# Patient Record
Sex: MALE | NOT HISPANIC OR LATINO | URBAN - METROPOLITAN AREA
[De-identification: names, ages, dates, MRNs, and addresses within clinical notes are randomized per-mention and may not be internally consistent; named-entity substitution may affect disease eponyms.]

---

## 2022-02-18 ENCOUNTER — HISTORICAL (OUTPATIENT)
Dept: CARDIOLOGY | Facility: HOSPITAL | Age: 47
End: 2022-02-18

## 2022-05-14 NOTE — OP NOTE
Patient:   Ayo Casper             MRN: [id number]            FIN: [id number]               Age:   46 years     Sex:  Male     :  1975   Associated Diagnoses:   CVA - Cerebrovascular accident; Palpitations   Author:   Dontrell Joyce DO      Brief Operative Note   Operative Information   Date/ Time:  2022 09:11:00.     Preoperative Diagnosis: CVA - Cerebrovascular accident (MJI66-HR I63.9), Palpitations (DIT80-EQ R00.2).     Postoperative Diagnosis: CVA - Cerebrovascular accident (EZG70-ZL I63.9), Palpitations (FAI24-GA R00.2).     Procedures Performed: Implantation of Implantable Loop Recorder - [employer]   .     Surgeon: Dontrell Joyce DO     Esimatefransico blood loss: No blood loss.     Description of Procedure/Findings/    Complications: See op report on chart.   .

## 2022-05-14 NOTE — DISCHARGE SUMMARY
DISCHARGE DATE:  02/18/2022    HOSPITAL COURSE:  Mr. Casper was admitted for evaluation and management of a recent cryptogenic stroke he suffered a few months ago, and for the elective implantation of implantable loop recorder to further evaluate these issues.  No new cardiovascular issues or problems reported.  As outlined in the operative report, under sterile surgical technique, a [employer] implantable loop recorder was implanted in the left parasternal location without complications.  Patient tolerated the procedure well, and postoperative course is being monitored closely in preparation of discharge home later today.    DISCHARGE DIAGNOSES:    1. Cryptogenic stroke.  2. Palpitations.   3. Hypertension.  4. Dyslipidemia.  5. Gastroesophageal reflux disease.  6. Chronic systolic congestive heart failure.    RECOMMENDATIONS:  Mr. Casper will be arranged for discharge home today, to continue on the same medications as on admission.  I have asked him to call or return should he have any further problems or complaints.  I will be checking on his progress in my office in the very near future.  We will continue to monitor his progress closely, and titrate medical therapies as tolerated.  He will be discharged on empiric Cipro for 5 days, and ibuprofen as needed for pain.  Patient's family is given instructions on wound care and also how to use the implantable loop recorder to further evaluate the issues and symptoms.  Further recommendations forthcoming.        ______________________________  Dontrell Joyce DO    CT/UD  DD:  02/18/2022  Time:  10:00AM  DT:  02/18/2022  Time:  10:16AM  Job #:  [id number]    cc: Dontrell Joyce DO

## 2023-05-16 DIAGNOSIS — H40.9 GLAUCOMA, UNSPECIFIED GLAUCOMA TYPE, UNSPECIFIED LATERALITY: Primary | ICD-10-CM

## 2023-12-15 ENCOUNTER — OFFICE VISIT (OUTPATIENT)
Dept: OPHTHALMOLOGY | Facility: CLINIC | Age: 48
End: 2023-12-15
Payer: MEDICAID

## 2023-12-15 DIAGNOSIS — H40.1134 PRIMARY OPEN ANGLE GLAUCOMA (POAG) OF BOTH EYES, INDETERMINATE STAGE: Primary | ICD-10-CM

## 2023-12-15 PROCEDURE — 92133 CPTRZD OPH DX IMG PST SGM ON: CPT | Mod: PBBFAC,PN | Performed by: STUDENT IN AN ORGANIZED HEALTH CARE EDUCATION/TRAINING PROGRAM

## 2023-12-15 PROCEDURE — 99212 OFFICE O/P EST SF 10 MIN: CPT | Mod: PBBFAC,PN | Performed by: STUDENT IN AN ORGANIZED HEALTH CARE EDUCATION/TRAINING PROGRAM

## 2023-12-15 PROCEDURE — 92133 CPTRZD OPH DX IMG PST SGM ON: CPT | Mod: PBBFAC,PN | Performed by: OPHTHALMOLOGY

## 2023-12-15 RX ORDER — AMOXICILLIN 500 MG
1 CAPSULE ORAL DAILY
COMMUNITY

## 2023-12-15 RX ORDER — CLOPIDOGREL BISULFATE 75 MG/1
75 TABLET ORAL
COMMUNITY

## 2023-12-15 RX ORDER — SACUBITRIL AND VALSARTAN 49; 51 MG/1; MG/1
1 TABLET, FILM COATED ORAL 2 TIMES DAILY
COMMUNITY

## 2023-12-15 RX ORDER — AMLODIPINE BESYLATE 5 MG/1
5 TABLET ORAL
COMMUNITY
Start: 2023-11-07

## 2023-12-15 RX ORDER — ATORVASTATIN CALCIUM 40 MG/1
40 TABLET, FILM COATED ORAL
COMMUNITY
Start: 2022-02-18

## 2023-12-15 RX ORDER — SPIRONOLACTONE 25 MG/1
25 TABLET ORAL
COMMUNITY

## 2023-12-15 RX ORDER — ASPIRIN 81 MG/1
81 TABLET ORAL
COMMUNITY
Start: 2022-02-18

## 2023-12-15 RX ORDER — CARVEDILOL 12.5 MG/1
12.5 TABLET ORAL EVERY 12 HOURS
COMMUNITY
Start: 2023-09-12

## 2023-12-15 NOTE — PROGRESS NOTES
OCT Mac 12/15/23   Normal foveal contour ou    OCT RNFL 12/15/23  All green ou, large cups      #POAG OU  -Referral from [facility] due to insurance change  -IOP 14 ou  -//567  -Gonio SS sup, CBB inf, TM lawson/temp ou  -Documented CDR 0.75 // 0.8 (declined DFE today)  -Unknown Tmax, on Latanaprost  -Hx stroke, may confound HVF results  With OCT showing minimal changes, will not change drops at this time.       RTC 4 weeks HVF 24-2, DFE

## 2023-12-26 DIAGNOSIS — H40.1134 PRIMARY OPEN ANGLE GLAUCOMA (POAG) OF BOTH EYES, INDETERMINATE STAGE: Primary | ICD-10-CM

## 2023-12-26 RX ORDER — LATANOPROST 50 UG/ML
1 SOLUTION/ DROPS OPHTHALMIC NIGHTLY
Qty: 2.5 ML | Refills: 11 | Status: SHIPPED | OUTPATIENT
Start: 2023-12-26 | End: 2024-12-27

## 2023-12-26 RX ORDER — LATANOPROST 50 UG/ML
1 SOLUTION/ DROPS OPHTHALMIC NIGHTLY
COMMUNITY
End: 2023-12-26 | Stop reason: SDUPTHER

## 2023-12-26 NOTE — TELEPHONE ENCOUNTER
----- Message from Chelsea Dumont sent at 12/22/2023 10:18 AM CST -----  Regarding: medication  Pt called said he had an appointment on 12/15 and medication was supposed to be called into his pharmacy, he checked with them this morning and they don't have any medication for him

## 2024-01-19 ENCOUNTER — OFFICE VISIT (OUTPATIENT)
Dept: OPHTHALMOLOGY | Facility: CLINIC | Age: 49
End: 2024-01-19
Payer: MEDICAID

## 2024-01-19 VITALS — WEIGHT: 187 LBS | BODY MASS INDEX: 29.35 KG/M2 | HEIGHT: 67 IN

## 2024-01-19 DIAGNOSIS — H40.1131 PRIMARY OPEN ANGLE GLAUCOMA (POAG) OF BOTH EYES, MILD STAGE: Primary | ICD-10-CM

## 2024-01-19 PROCEDURE — 99213 OFFICE O/P EST LOW 20 MIN: CPT | Mod: PBBFAC,PN | Performed by: STUDENT IN AN ORGANIZED HEALTH CARE EDUCATION/TRAINING PROGRAM

## 2024-01-19 PROCEDURE — 92083 EXTENDED VISUAL FIELD XM: CPT | Mod: PBBFAC,PN | Performed by: STUDENT IN AN ORGANIZED HEALTH CARE EDUCATION/TRAINING PROGRAM

## 2024-01-19 PROCEDURE — 92083 EXTENDED VISUAL FIELD XM: CPT | Mod: PBBFAC,PN | Performed by: OPHTHALMOLOGY
